# Patient Record
Sex: MALE | URBAN - METROPOLITAN AREA
[De-identification: names, ages, dates, MRNs, and addresses within clinical notes are randomized per-mention and may not be internally consistent; named-entity substitution may affect disease eponyms.]

---

## 2019-09-04 ENCOUNTER — OFFICE VISIT (OUTPATIENT)
Dept: URBAN - METROPOLITAN AREA CLINIC 45 | Facility: CLINIC | Age: 59
End: 2019-09-04
Payer: COMMERCIAL

## 2019-09-04 DIAGNOSIS — H01.8 OTHER SPECIFIED INFLAMMATION OF EYELID: Primary | ICD-10-CM

## 2019-09-04 DIAGNOSIS — H10.11 ACUTE ATOPIC CONJUNCTIVITIS, RIGHT EYE: ICD-10-CM

## 2019-09-04 DIAGNOSIS — H04.123 DRY EYE SYNDROME OF BILATERAL LACRIMAL GLANDS: ICD-10-CM

## 2019-09-04 PROCEDURE — 92004 COMPRE OPH EXAM NEW PT 1/>: CPT | Performed by: OPTOMETRIST

## 2019-09-04 RX ORDER — AZITHROMYCIN DIHYDRATE 250 MG/1
250 MG TABLET, FILM COATED ORAL
Qty: 1 | Refills: 0 | Status: INACTIVE
Start: 2019-09-04 | End: 2019-09-08

## 2019-09-04 ASSESSMENT — INTRAOCULAR PRESSURE
OS: 14
OD: 13

## 2019-09-04 NOTE — IMPRESSION/PLAN
Impression: Acute atopic conjunctivitis, right eye: H10.11.  Plan: af tears prn. if symptoms worsen will discuss gtts

## 2019-09-04 NOTE — IMPRESSION/PLAN
Impression: Other specified inflammation of eyelid: H01.8. Plan: Discussed diagnosis in detail with patient. Patient instructed to apply warm compresses. Patient instructed to use artificial tears as needed.  Pt to start Z-pack 2pill po first day and 1 pill po until course is finished